# Patient Record
(demographics unavailable — no encounter records)

---

## 2025-04-09 NOTE — PHYSICAL EXAM
[FreeTextEntry1] : Mild lumbar palpation tenderness Moderate limitation of forward flexion lumbar spine Straight leg raising positive bilaterally at about 80 degrees [General Appearance - Alert] : alert [General Appearance - In No Acute Distress] : in no acute distress [General Appearance - Well-Appearing] : healthy appearing [Oriented To Time, Place, And Person] : oriented to person, place, and time [Impaired Insight] : insight and judgment were intact [Affect] : the affect was normal [Memory Recent] : recent memory was not impaired [Person] : oriented to person [Place] : oriented to place [Time] : oriented to time [Concentration Intact] : normal concentrating ability [Visual Intact] : visual attention was ~T not ~L decreased [Fluency] : fluency intact [Comprehension] : comprehension intact [Past History] : adequate knowledge of personal past history [Cranial Nerves Optic (II)] : visual acuity intact bilaterally,  visual fields full to confrontation, pupils equal round and reactive to light [Cranial Nerves Oculomotor (III)] : extraocular motion intact [Cranial Nerves Trigeminal (V)] : facial sensation intact symmetrically [Cranial Nerves Facial (VII)] : face symmetrical [Cranial Nerves Vestibulocochlear (VIII)] : hearing was intact bilaterally [Cranial Nerves Glossopharyngeal (IX)] : tongue and palate midline [Cranial Nerves Accessory (XI - Cranial And Spinal)] : head turning and shoulder shrug symmetric [Cranial Nerves Hypoglossal (XII)] : there was no tongue deviation with protrusion [Motor Tone] : muscle tone was normal in all four extremities [Motor Strength] : muscle strength was normal in all four extremities [No Muscle Atrophy] : normal bulk in all four extremities [Paresis Pronator Drift Right-Sided] : no pronator drift on the right [Paresis Pronator Drift Left-Sided] : no pronator drift on the left [Sensation Tactile Decrease] : light touch was intact [Sensation Pain / Temperature Decrease] : pain and temperature was intact [Proprioception] : proprioception was intact [Romberg's Sign] : Romberg's sign was negtive [Balance] : balance was intact [Past-pointing] : there was no past-pointing [Tremor] : no tremor present [Coordination - Dysmetria Impaired Finger-to-Nose Bilateral] : not present [2+] : Biceps left 2+ [1+] : Ankle jerk left 1+ [Plantar Reflex Right Only] : normal on the right [Plantar Reflex Left Only] : normal on the left [FreeTextEntry8] : Gait antalgic due to pain, ambulates independently [PERRL With Normal Accommodation] : pupils were equal in size, round, reactive to light, with normal accommodation [Extraocular Movements] : extraocular movements were intact [Full Visual Field] : full visual field

## 2025-04-09 NOTE — ASSESSMENT
[FreeTextEntry1] : Chronic back pain ever since a work-related injury on 8/19/2015 He has some mechanical findings referable to the lumbar spine but no neurological deficits He follows with orthopedics who is requesting EMG and nerve conduction studies of the lower extremities including the paraspinals This will be accomplished once authorization is obtained.

## 2025-04-09 NOTE — HISTORY OF PRESENT ILLNESS
[FreeTextEntry1] : This 73-year-old man was seen in neurological consultation today accompanied by his wife He suffered a work-related injury on 8/19/2015.  He works for the town and passed out at work and suffered injury to his lower back He has  persistent low back pain radiating down the legs left more so than right Pain is worse proximally than distally and involves mainly the lateral aspect of the legs There is occasional numbness of the legs Occasionally they will give out on him There is no bowel or bladder dysfunction  He was referred here by his orthopedist who is requesting EMG and nerve conduction studies of the lower extremities including the paraspinals.  I have available report of his CT scan of the lumbar spine from 12/3/2024 which reports moderate canal stenosis and moderate foraminal narrowing at multiple levels  He has had extensive physical therapy which really has not helped Pain management injections also did not help Acupuncture helped a little  He has declined surgery as he said that his son passed away during back surgery  Using gabapentin at bedtime.  He does not recall the dosage Also uses meloxicam and Voltaren gel  Medical history otherwise significant for diabetes, coronary artery disease status post bypass surgery  Non-smoker, no alcohol use.